# Patient Record
Sex: MALE | Race: WHITE | NOT HISPANIC OR LATINO | URBAN - METROPOLITAN AREA
[De-identification: names, ages, dates, MRNs, and addresses within clinical notes are randomized per-mention and may not be internally consistent; named-entity substitution may affect disease eponyms.]

---

## 2017-01-01 ENCOUNTER — RECORDS - HEALTHEAST (OUTPATIENT)
Dept: LAB | Facility: CLINIC | Age: 82
End: 2017-01-01

## 2017-01-01 LAB
CHOLEST SERPL-MCNC: 120 MG/DL
FASTING STATUS PATIENT QL REPORTED: NORMAL
HDLC SERPL-MCNC: 58 MG/DL
LDLC SERPL CALC-MCNC: 53 MG/DL
TRIGL SERPL-MCNC: 47 MG/DL

## 2017-09-06 ENCOUNTER — HOSPITAL ENCOUNTER (OUTPATIENT)
Dept: PHYSICAL MEDICINE AND REHAB | Facility: CLINIC | Age: 82
Discharge: HOME | End: 2017-09-06
Attending: PHYSICAL MEDICINE & REHABILITATION

## 2017-09-06 DIAGNOSIS — M25.551 HIP PAIN, RIGHT: ICD-10-CM

## 2017-09-06 DIAGNOSIS — M79.18 MYOFASCIAL PAIN: ICD-10-CM

## 2017-09-06 DIAGNOSIS — R53.1 WEAKNESS: ICD-10-CM

## 2017-09-06 RX ORDER — SIMVASTATIN 10 MG
10 TABLET ORAL AT BEDTIME
Status: SHIPPED | COMMUNITY
Start: 2017-09-06

## 2017-09-06 ASSESSMENT — MIFFLIN-ST. JEOR: SCORE: 1420.88

## 2017-09-11 ENCOUNTER — HOSPITAL ENCOUNTER (OUTPATIENT)
Dept: CT IMAGING | Facility: HOSPITAL | Age: 82
Discharge: HOME | End: 2017-09-11
Attending: PHYSICAL MEDICINE & REHABILITATION

## 2017-09-11 DIAGNOSIS — M25.551 HIP PAIN, RIGHT: ICD-10-CM

## 2017-09-12 ENCOUNTER — HOSPITAL ENCOUNTER (OUTPATIENT)
Dept: PHYSICAL MEDICINE AND REHAB | Facility: CLINIC | Age: 82
Discharge: HOME | End: 2017-09-12
Attending: PHYSICAL MEDICINE & REHABILITATION

## 2017-09-12 DIAGNOSIS — M79.18 MYOFASCIAL PAIN: ICD-10-CM

## 2017-09-12 DIAGNOSIS — T84.018A: ICD-10-CM

## 2017-09-12 DIAGNOSIS — M25.551 HIP PAIN, RIGHT: ICD-10-CM

## 2017-09-12 DIAGNOSIS — R53.1 WEAKNESS: ICD-10-CM

## 2017-09-12 DIAGNOSIS — Z96.649: ICD-10-CM

## 2018-01-01 ENCOUNTER — OFFICE VISIT - HEALTHEAST (OUTPATIENT)
Dept: CARDIOLOGY | Facility: CLINIC | Age: 83
End: 2018-01-01

## 2018-01-01 ENCOUNTER — HOSPITAL ENCOUNTER (OUTPATIENT)
Dept: MRI IMAGING | Facility: HOSPITAL | Age: 83
Discharge: HOME | End: 2018-08-31
Attending: INTERNAL MEDICINE

## 2018-01-01 ENCOUNTER — RECORDS - HEALTHEAST (OUTPATIENT)
Dept: LAB | Facility: CLINIC | Age: 83
End: 2018-01-01

## 2018-01-01 ENCOUNTER — AMBULATORY - HEALTHEAST (OUTPATIENT)
Dept: CARDIOLOGY | Facility: CLINIC | Age: 83
End: 2018-01-01

## 2018-01-01 ENCOUNTER — RECORDS - HEALTHEAST (OUTPATIENT)
Dept: ADMINISTRATIVE | Facility: OTHER | Age: 83
End: 2018-01-01

## 2018-01-01 ENCOUNTER — COMMUNICATION - HEALTHEAST (OUTPATIENT)
Dept: CARDIOLOGY | Facility: CLINIC | Age: 83
End: 2018-01-01

## 2018-01-01 ENCOUNTER — OFFICE VISIT - HEALTHEAST (OUTPATIENT)
Dept: GERIATRICS | Facility: CLINIC | Age: 83
End: 2018-01-01

## 2018-01-01 ENCOUNTER — HOSPITAL ENCOUNTER (OUTPATIENT)
Dept: NUCLEAR MEDICINE | Facility: HOSPITAL | Age: 83
Discharge: HOME | End: 2018-03-08
Attending: INTERNAL MEDICINE

## 2018-01-01 ENCOUNTER — AMBULATORY - HEALTHEAST (OUTPATIENT)
Dept: GERIATRICS | Facility: CLINIC | Age: 83
End: 2018-01-01

## 2018-01-01 ENCOUNTER — HOSPITAL ENCOUNTER (OUTPATIENT)
Dept: CARDIOLOGY | Facility: HOSPITAL | Age: 83
Discharge: HOME | End: 2018-03-08
Attending: INTERNAL MEDICINE

## 2018-01-01 ENCOUNTER — HOSPITAL ENCOUNTER (OUTPATIENT)
Dept: CARDIOLOGY | Facility: HOSPITAL | Age: 83
Discharge: HOME | End: 2018-07-06
Attending: INTERNAL MEDICINE

## 2018-01-01 ENCOUNTER — AMBULATORY - HEALTHEAST (OUTPATIENT)
Dept: ADMINISTRATIVE | Facility: CLINIC | Age: 83
End: 2018-01-01

## 2018-01-01 DIAGNOSIS — I25.5 ISCHEMIC CARDIOMYOPATHY: ICD-10-CM

## 2018-01-01 DIAGNOSIS — I10 ESSENTIAL HYPERTENSION: ICD-10-CM

## 2018-01-01 DIAGNOSIS — I50.20 HFREF (HEART FAILURE WITH REDUCED EJECTION FRACTION) (H): ICD-10-CM

## 2018-01-01 DIAGNOSIS — I48.20 CHRONIC ATRIAL FIBRILLATION (H): ICD-10-CM

## 2018-01-01 DIAGNOSIS — I25.10 CORONARY ARTERY DISEASE INVOLVING NATIVE CORONARY ARTERY OF NATIVE HEART WITHOUT ANGINA PECTORIS: ICD-10-CM

## 2018-01-01 DIAGNOSIS — Z96.649 S/P REVISION OF TOTAL HIP: ICD-10-CM

## 2018-01-01 DIAGNOSIS — D64.9 ANEMIA: ICD-10-CM

## 2018-01-01 DIAGNOSIS — I42.0 DILATED CARDIOMYOPATHY (H): ICD-10-CM

## 2018-01-01 DIAGNOSIS — Z79.01 ANTICOAGULATION MANAGEMENT ENCOUNTER: ICD-10-CM

## 2018-01-01 DIAGNOSIS — Z96.641 HISTORY OF TOTAL RIGHT HIP ARTHROPLASTY: ICD-10-CM

## 2018-01-01 DIAGNOSIS — I48.19 PERSISTENT ATRIAL FIBRILLATION (H): ICD-10-CM

## 2018-01-01 DIAGNOSIS — R79.89 ELEVATED BRAIN NATRIURETIC PEPTIDE (BNP) LEVEL: ICD-10-CM

## 2018-01-01 DIAGNOSIS — I50.22 CHRONIC SYSTOLIC CONGESTIVE HEART FAILURE (H): ICD-10-CM

## 2018-01-01 DIAGNOSIS — I25.10 CORONARY ARTERY DISEASE: ICD-10-CM

## 2018-01-01 DIAGNOSIS — I48.91 AF (ATRIAL FIBRILLATION) (H): ICD-10-CM

## 2018-01-01 DIAGNOSIS — I50.20 HEART FAILURE WITH REDUCED EJECTION FRACTION (H): ICD-10-CM

## 2018-01-01 DIAGNOSIS — E78.5 DYSLIPIDEMIA: ICD-10-CM

## 2018-01-01 DIAGNOSIS — R52 PAIN MANAGEMENT: ICD-10-CM

## 2018-01-01 DIAGNOSIS — Z51.81 ANTICOAGULATION MANAGEMENT ENCOUNTER: ICD-10-CM

## 2018-01-01 DIAGNOSIS — M19.90 DEGENERATIVE JOINT DISEASE: ICD-10-CM

## 2018-01-01 LAB
ALBUMIN SERPL-MCNC: 3.9 G/DL (ref 3.5–5)
ALP SERPL-CCNC: 102 U/L (ref 45–120)
ALT SERPL W P-5'-P-CCNC: 23 U/L (ref 0–45)
ANION GAP SERPL CALCULATED.3IONS-SCNC: 10 MMOL/L (ref 5–18)
ANION GAP SERPL CALCULATED.3IONS-SCNC: 10 MMOL/L (ref 5–18)
ANION GAP SERPL CALCULATED.3IONS-SCNC: 11 MMOL/L (ref 5–18)
ANION GAP SERPL CALCULATED.3IONS-SCNC: 12 MMOL/L (ref 5–18)
ANION GAP SERPL CALCULATED.3IONS-SCNC: 9 MMOL/L (ref 5–18)
AORTIC ROOT: 3.5 CM
AORTIC VALVE MEAN VELOCITY: 153 CM/S
AORTIC VALVE MEAN VELOCITY: 165 CM/S
AR DECEL SLOPE: 1780 MM/S2
AR DECEL SLOPE: 2610 MM/S2
AR PEAK VELOCITY: 416 CM/S
AR PEAK VELOCITY: 424 CM/S
AST SERPL W P-5'-P-CCNC: 28 U/L (ref 0–40)
ATRIAL RATE - MUSE: NORMAL BPM
AV AR END DIASTOLIC VELOCITY: 311 CM/S
AV AR END DIASTOLIC VELOCITY: 317 CM/S
AV AR END DIASTOLIC VELOCITY: 322 CM/S
AV CUSP SEPERATION: 1 CM
AV CUSP SEPERATION: 1 CM
AV DIMENSIONLESS INDEX VTI: 0.3
AV DIMENSIONLESS INDEX VTI: 0.3
AV MEAN GRADIENT: 11 MMHG
AV MEAN GRADIENT: 13 MMHG
AV PEAK GRADIENT: 19.2 MMHG
AV PEAK GRADIENT: 20.1 MMHG
AV REGURGITANT PEAK GRADIENT: 69.2 MMHG
AV REGURGITANT PEAK GRADIENT: 71.9 MMHG
AV REGURGITATION PRESSURE HALF TIME: 533 MS
AV REGURGITATION PRESSURE HALF TIME: 686 MS
AV VALVE AREA: 1.1 CM2
AV VALVE AREA: 1.1 CM2
AV VELOCITY RATIO: 0.4
BILIRUB SERPL-MCNC: 1.9 MG/DL (ref 0–1)
BNP SERPL-MCNC: 1619 PG/ML (ref 0–88)
BNP SERPL-MCNC: 2370 PG/ML (ref 0–88)
BNP SERPL-MCNC: 989 PG/ML (ref 0–88)
BSA FOR ECHO PROCEDURE: 1.9 M2
BSA FOR ECHO PROCEDURE: 1.93 M2
BUN SERPL-MCNC: 12 MG/DL (ref 8–28)
BUN SERPL-MCNC: 12 MG/DL (ref 8–28)
BUN SERPL-MCNC: 13 MG/DL (ref 8–28)
BUN SERPL-MCNC: 14 MG/DL (ref 8–28)
BUN SERPL-MCNC: 15 MG/DL (ref 8–28)
BUN SERPL-MCNC: 16 MG/DL (ref 8–28)
BUN SERPL-MCNC: 17 MG/DL (ref 8–28)
BUN SERPL-MCNC: 17 MG/DL (ref 8–28)
BUN SERPL-MCNC: 18 MG/DL (ref 8–28)
CALCIUM SERPL-MCNC: 10 MG/DL (ref 8.5–10.5)
CALCIUM SERPL-MCNC: 10.1 MG/DL (ref 8.5–10.5)
CALCIUM SERPL-MCNC: 10.1 MG/DL (ref 8.5–10.5)
CALCIUM SERPL-MCNC: 10.2 MG/DL (ref 8.5–10.5)
CALCIUM SERPL-MCNC: 10.2 MG/DL (ref 8.5–10.5)
CALCIUM SERPL-MCNC: 10.7 MG/DL (ref 8.5–10.5)
CALCIUM SERPL-MCNC: 9.6 MG/DL (ref 8.5–10.5)
CALCIUM SERPL-MCNC: 9.9 MG/DL (ref 8.5–10.5)
CALCIUM SERPL-MCNC: 9.9 MG/DL (ref 8.5–10.5)
CCTA EJECTION FRACTION: 40 %
CCTA INTERVENTRICULAR SETPUM: 0.8 CM (ref 0.6–1.1)
CCTA LEFT INTERNAL DIMENSION IN SYSTOLE: 4.5 CM (ref 2.1–4)
CCTA LEFT VENTRICULAR INTERNAL DIMENSION IN DIASTOLE: 5.9 CM (ref 3.5–6)
CCTA LEFT VENTRICULAR MASS: 180.72 G
CCTA POSTERIOR WALL: 0.8 CM (ref 0.6–1.1)
CHLORIDE BLD-SCNC: 101 MMOL/L (ref 98–107)
CHLORIDE BLD-SCNC: 84 MMOL/L (ref 98–107)
CHLORIDE BLD-SCNC: 84 MMOL/L (ref 98–107)
CHLORIDE BLD-SCNC: 87 MMOL/L (ref 98–107)
CHLORIDE BLD-SCNC: 88 MMOL/L (ref 98–107)
CHLORIDE BLD-SCNC: 88 MMOL/L (ref 98–107)
CHLORIDE BLD-SCNC: 93 MMOL/L (ref 98–107)
CHLORIDE BLD-SCNC: 93 MMOL/L (ref 98–107)
CHLORIDE BLD-SCNC: 99 MMOL/L (ref 98–107)
CO2 SERPL-SCNC: 25 MMOL/L (ref 22–31)
CO2 SERPL-SCNC: 25 MMOL/L (ref 22–31)
CO2 SERPL-SCNC: 28 MMOL/L (ref 22–31)
CO2 SERPL-SCNC: 29 MMOL/L (ref 22–31)
CO2 SERPL-SCNC: 29 MMOL/L (ref 22–31)
CO2 SERPL-SCNC: 30 MMOL/L (ref 22–31)
CO2 SERPL-SCNC: 30 MMOL/L (ref 22–31)
CREAT SERPL-MCNC: 0.75 MG/DL (ref 0.7–1.3)
CREAT SERPL-MCNC: 0.76 MG/DL (ref 0.7–1.3)
CREAT SERPL-MCNC: 0.76 MG/DL (ref 0.7–1.3)
CREAT SERPL-MCNC: 0.78 MG/DL (ref 0.7–1.3)
CREAT SERPL-MCNC: 0.81 MG/DL (ref 0.7–1.3)
CREAT SERPL-MCNC: 0.82 MG/DL (ref 0.7–1.3)
CREAT SERPL-MCNC: 0.92 MG/DL (ref 0.7–1.3)
CV ECHO HEIGHT: 68.5 IN
CV ECHO HEIGHT: 68.5 IN
CV ECHO WEIGHT: 165 LBS
CV ECHO WEIGHT: 171 LBS
CV STRESS CURRENT BP HE: NORMAL
CV STRESS CURRENT HR HE: 64
CV STRESS CURRENT HR HE: 64
CV STRESS CURRENT HR HE: 65
CV STRESS CURRENT HR HE: 66
CV STRESS CURRENT HR HE: 66
CV STRESS CURRENT HR HE: 67
CV STRESS CURRENT HR HE: 68
CV STRESS CURRENT HR HE: 68
CV STRESS CURRENT HR HE: 69
CV STRESS CURRENT HR HE: 70
CV STRESS CURRENT HR HE: 71
CV STRESS CURRENT HR HE: 72
CV STRESS CURRENT HR HE: 73
CV STRESS CURRENT HR HE: 73
CV STRESS DEVIATION TIME HE: NORMAL
CV STRESS ECHO PERCENT HR HE: NORMAL
CV STRESS EXERCISE STAGE HE: NORMAL
CV STRESS FINAL RESTING BP HE: NORMAL
CV STRESS FINAL RESTING HR HE: 68
CV STRESS MAX HR HE: 84
CV STRESS MAX TREADMILL GRADE HE: 0
CV STRESS MAX TREADMILL SPEED HE: 0
CV STRESS PEAK DIA BP HE: NORMAL
CV STRESS PEAK SYS BP HE: NORMAL
CV STRESS PHASE HE: NORMAL
CV STRESS PROTOCOL HE: NORMAL
CV STRESS RESTING PT POSITION HE: NORMAL
CV STRESS ST DEVIATION AMOUNT HE: NORMAL
CV STRESS ST DEVIATION ELEVATION HE: NORMAL
CV STRESS ST EVELATION AMOUNT HE: NORMAL
CV STRESS TEST TYPE HE: NORMAL
CV STRESS TOTAL STAGE TIME MIN 1 HE: NORMAL
DIASTOLIC BLOOD PRESSURE - MUSE: NORMAL MMHG
DOP CALC AO PEAK VEL: 219 CM/S
DOP CALC AO PEAK VEL: 224 CM/S
DOP CALC AO VTI: 43.8 CM
DOP CALC AO VTI: 48.6 CM
DOP CALC LVOT AREA: 3.14 CM2
DOP CALC LVOT AREA: 3.46 CM2
DOP CALC LVOT DIAMETER: 2 CM
DOP CALC LVOT DIAMETER: 2.1 CM
DOP CALC LVOT PEAK VEL: 93.1 CM/S
DOP CALC LVOT STROKE VOLUME: 46.7 CM3
DOP CALC LVOT STROKE VOLUME: 51.5 CM3
DOP CALC MV VTI: 26 CM
DOP CALCLVOT PEAK VEL VTI: 13.5 CM
DOP CALCLVOT PEAK VEL VTI: 16.4 CM
ECHO EJECTION FRACTION ESTIMATED: 25 %
ECHO EJECTION FRACTION ESTIMATED: 35 %
EJECTION FRACTION: 49 % (ref 55–75)
ERYTHROCYTE [DISTWIDTH] IN BLOOD BY AUTOMATED COUNT: 10.6 % (ref 11–14.5)
ERYTHROCYTE [DISTWIDTH] IN BLOOD BY AUTOMATED COUNT: 12.3 % (ref 11–14.5)
ERYTHROCYTE [DISTWIDTH] IN BLOOD BY AUTOMATED COUNT: 12.8 % (ref 11–14.5)
FRACTIONAL SHORTENING: 16.5 % (ref 28–44)
FRACTIONAL SHORTENING: 19.2 % (ref 28–44)
GFR SERPL CREATININE-BSD FRML MDRD: >60 ML/MIN/1.73M2
GLUCOSE BLD-MCNC: 101 MG/DL (ref 70–125)
GLUCOSE BLD-MCNC: 101 MG/DL (ref 70–125)
GLUCOSE BLD-MCNC: 107 MG/DL (ref 70–125)
GLUCOSE BLD-MCNC: 125 MG/DL (ref 70–125)
GLUCOSE BLD-MCNC: 86 MG/DL (ref 70–125)
GLUCOSE BLD-MCNC: 90 MG/DL (ref 70–125)
GLUCOSE BLD-MCNC: 91 MG/DL (ref 70–125)
GLUCOSE BLD-MCNC: 92 MG/DL (ref 70–125)
GLUCOSE BLD-MCNC: 98 MG/DL (ref 70–125)
HBA1C MFR BLD: 5.8 % (ref 4.2–6.1)
HCT VFR BLD AUTO: 42.1 % (ref 40–54)
HCT VFR BLD AUTO: 43.8 % (ref 40–54)
HCT VFR BLD AUTO: 45.5 % (ref 40–54)
HGB BLD-MCNC: 14.4 G/DL (ref 14–18)
HGB BLD-MCNC: 14.7 G/DL (ref 14–18)
HGB BLD-MCNC: 15 G/DL (ref 14–18)
HGB BLD-MCNC: 9.8 G/DL (ref 14–18)
INTERPRETATION ECG - MUSE: NORMAL
INTERVENTRICULAR SEPTUM IN END DIASTOLE: 0.9 CM (ref 0.6–1)
INTERVENTRICULAR SEPTUM IN END DIASTOLE: 0.99 CM (ref 0.6–1)
IVS/PW RATIO: 0.8
IVS/PW RATIO: 0.9
LA AREA 1: 27 CM2
LA AREA 2: 27 CM2
LEFT ATRIUM LENGTH: 6.35 CM
LEFT ATRIUM SIZE: 5.7 CM
LEFT ATRIUM VOLUME INDEX: 51.4 ML/M2
LEFT ATRIUM VOLUME: 97.6 ML
LEFT VENTRICLE CARDIAC INDEX: 1.9 L/MIN/M2
LEFT VENTRICLE CARDIAC INDEX: 2 L/MIN/M2
LEFT VENTRICLE CARDIAC OUTPUT: 3.7 L/MIN
LEFT VENTRICLE CARDIAC OUTPUT: 3.7 L/MIN
LEFT VENTRICLE DIASTOLIC VOLUME INDEX: 45.8 CM3/M2 (ref 34–74)
LEFT VENTRICLE DIASTOLIC VOLUME: 87 CM3 (ref 62–150)
LEFT VENTRICLE HEART RATE: 71 BPM
LEFT VENTRICLE HEART RATE: 80 BPM
LEFT VENTRICLE MASS INDEX: 102.2 G/M2
LEFT VENTRICLE MASS INDEX: 116.5 G/M2
LEFT VENTRICLE SYSTOLIC VOLUME INDEX: 23.2 CM3/M2 (ref 11–31)
LEFT VENTRICLE SYSTOLIC VOLUME: 44 CM3 (ref 21–61)
LEFT VENTRICULAR INTERNAL DIMENSION IN DIASTOLE: 5.2 CM (ref 4.2–5.8)
LEFT VENTRICULAR INTERNAL DIMENSION IN DIASTOLE: 5.46 CM (ref 4.2–5.8)
LEFT VENTRICULAR INTERNAL DIMENSION IN SYSTOLE: 4.2 CM (ref 2.5–4)
LEFT VENTRICULAR INTERNAL DIMENSION IN SYSTOLE: 4.56 CM (ref 2.5–4)
LEFT VENTRICULAR MASS: 194.2 G
LEFT VENTRICULAR MASS: 224.8 G
LEFT VENTRICULAR OUTFLOW TRACT MEAN GRADIENT: 1 MMHG
LEFT VENTRICULAR OUTFLOW TRACT MEAN GRADIENT: 2 MMHG
LEFT VENTRICULAR OUTFLOW TRACT MEAN VELOCITY: 48.7 CM/S
LEFT VENTRICULAR OUTFLOW TRACT MEAN VELOCITY: 64 CM/S
LEFT VENTRICULAR OUTFLOW TRACT PEAK GRADIENT: 3 MMHG
LEFT VENTRICULAR POSTERIOR WALL IN END DIASTOLE: 1.1 CM (ref 0.6–1)
LEFT VENTRICULAR POSTERIOR WALL IN END DIASTOLE: 1.11 CM (ref 0.6–1)
LV STROKE VOLUME INDEX: 24.6 ML/M2
LV STROKE VOLUME INDEX: 26.7 ML/M2
MCH RBC QN AUTO: 31.7 PG (ref 27–34)
MCH RBC QN AUTO: 32.5 PG (ref 27–34)
MCH RBC QN AUTO: 33.1 PG (ref 27–34)
MCHC RBC AUTO-ENTMCNC: 33 G/DL (ref 32–36)
MCHC RBC AUTO-ENTMCNC: 33.6 G/DL (ref 32–36)
MCHC RBC AUTO-ENTMCNC: 34.2 G/DL (ref 32–36)
MCV RBC AUTO: 100 FL (ref 80–100)
MCV RBC AUTO: 93 FL (ref 80–100)
MCV RBC AUTO: 97 FL (ref 80–100)
MITRAL VALVE DECELERATION SLOPE: 3390 MM/S2
MITRAL VALVE MEAN INFLOW VELOCITY: 55.7 CM/S
MITRAL VALVE PEAK VELOCITY: 102 CM/S
MITRAL VALVE PRESSURE HALF-TIME: 92 MS
MR CARDIAC LEFT VENTRIAL CARDIAC INDEX: 2.2 L/MIN/M2 (ref 1.7–4.2)
MR CARDIAC LEFT VENTRICAL CARDIAC OUTPUT: 4.2 L/MIN (ref 2.8–8.8)
MR CARDIAC LEFT VENTRICULAR DIASTOLIC VOLUME INDEX: 80.9 ML/M2 (ref 47–92)
MR CARDIAC LEFT VENTRICULAR MASS INDEX: 88.95 G/M2 (ref 70–113)
MR CARDIAC LEFT VENTRICULAR MASS: 168 G (ref 118–238)
MR CARDIAC LEFT VENTRICULAR STROKE VOLUME INDEX: 26.79 ML/M2 (ref 32–62)
MR CARDIAC LEFT VENTRICULAR SYSTOLIC VOLUME INDEX: 54.11 ML/M2 (ref 13–30)
MR EJECTION FRACTION: 33.12 %
MR HEIGHT: 1.74 M
MR LEFT VENTRICULAR DYSTOLIC VOLUMEN: 152.8 ML (ref 77–195)
MR LEFT VENTRICULAR STROKE VOLUMEN: 50.6 ML (ref 51–133)
MR LEFT VENTRICULAR SYSTOLIC VOLUME: 102.2 ML (ref 19–72)
MR WEIGHT: 74.4 KG
MV AREA VTI: 1.8 CM2
MV AVERAGE E/E' RATIO: 17.6 CM/S
MV DECELERATION TIME: 127 MS
MV E'TISSUE VEL-LAT: 6.82 CM/S
MV E'TISSUE VEL-MED: 4.29 CM/S
MV LATERAL E/E' RATIO: 14.3
MV MEAN GRADIENT: 2 MMHG
MV MEDIAL E/E' RATIO: 22.8
MV PEAK E VELOCITY: 97.7 CM/S
MV PEAK GRADIENT: 4.2 MMHG
MV VALVE AREA BY CONTINUITY EQUATION: 1.8 CM2
MV VALVE AREA PRESSURE 1/2 METHOD: 2.4 CM2
NUC REST DIASTOLIC VOLUME INDEX: 2640 LBS
NUC REST DIASTOLIC VOLUME INDEX: 2736 LBS
NUC REST SYSTOLIC VOLUME INDEX: 68.5 IN
NUC REST SYSTOLIC VOLUME INDEX: 68.5 IN
NUC STRESS EJECTION FRACTION: 43 %
P AXIS - MUSE: NORMAL DEGREES
PLATELET # BLD AUTO: 153 THOU/UL (ref 140–440)
PLATELET # BLD AUTO: 178 THOU/UL (ref 140–440)
PLATELET # BLD AUTO: 206 THOU/UL (ref 140–440)
PMV BLD AUTO: 6.8 FL (ref 7–10)
PMV BLD AUTO: 8.6 FL (ref 8.5–12.5)
PMV BLD AUTO: 9.2 FL (ref 8.5–12.5)
POTASSIUM BLD-SCNC: 3.6 MMOL/L (ref 3.5–5)
POTASSIUM BLD-SCNC: 3.7 MMOL/L (ref 3.5–5)
POTASSIUM BLD-SCNC: 3.7 MMOL/L (ref 3.5–5)
POTASSIUM BLD-SCNC: 3.8 MMOL/L (ref 3.5–5)
POTASSIUM BLD-SCNC: 3.8 MMOL/L (ref 3.5–5)
POTASSIUM BLD-SCNC: 3.9 MMOL/L (ref 3.5–5)
POTASSIUM BLD-SCNC: 4.2 MMOL/L (ref 3.5–5)
PR INTERVAL - MUSE: NORMAL MS
PR MAX PG: 13 MMHG
PR PEAK VELOCITY: 189 CM/S
PROT SERPL-MCNC: 7 G/DL (ref 6–8)
QRS DURATION - MUSE: 88 MS
QT - MUSE: 414 MS
QTC - MUSE: 462 MS
R AXIS - MUSE: -16 DEGREES
RBC # BLD AUTO: 4.52 MILL/UL (ref 4.4–6.2)
RBC # BLD AUTO: 4.54 MILL/UL (ref 4.4–6.2)
RBC # BLD AUTO: 4.54 MILL/UL (ref 4.4–6.2)
SODIUM SERPL-SCNC: 123 MMOL/L (ref 136–145)
SODIUM SERPL-SCNC: 124 MMOL/L (ref 136–145)
SODIUM SERPL-SCNC: 127 MMOL/L (ref 136–145)
SODIUM SERPL-SCNC: 128 MMOL/L (ref 136–145)
SODIUM SERPL-SCNC: 128 MMOL/L (ref 136–145)
SODIUM SERPL-SCNC: 129 MMOL/L (ref 136–145)
SODIUM SERPL-SCNC: 130 MMOL/L (ref 136–145)
SODIUM SERPL-SCNC: 138 MMOL/L (ref 136–145)
SODIUM SERPL-SCNC: 140 MMOL/L (ref 136–145)
STRESS ECHO BASELINE BP: NORMAL
STRESS ECHO BASELINE HR: 64
STRESS ECHO CALCULATED PERCENT HR: 60 %
STRESS ECHO LAST STRESS BP: NORMAL
STRESS ECHO LAST STRESS HR: 65
SYSTOLIC BLOOD PRESSURE - MUSE: NORMAL MMHG
T AXIS - MUSE: 254 DEGREES
TRICUSPID REGURGITATION PEAK PRESSURE GRADIENT: 40.7 MMHG
TRICUSPID VALVE ANULAR PLANE SYSTOLIC EXCURSION: 1.5 CM
TRICUSPID VALVE PEAK REGURGITANT VELOCITY: 319 CM/S
VENTRICULAR RATE- MUSE: 75 BPM
WBC: 4.6 THOU/UL (ref 4–11)
WBC: 4.8 THOU/UL (ref 4–11)
WBC: 5.1 THOU/UL (ref 4–11)

## 2018-01-01 RX ORDER — LISINOPRIL 10 MG/1
10 TABLET ORAL DAILY
Qty: 90 TABLET | Refills: 3 | Status: SHIPPED | OUTPATIENT
Start: 2018-01-01

## 2018-01-01 RX ORDER — ATENOLOL 50 MG/1
50 TABLET ORAL 2 TIMES DAILY
Refills: 0 | Status: SHIPPED | COMMUNITY
Start: 2018-01-01

## 2018-01-01 RX ORDER — FUROSEMIDE 40 MG
40 TABLET ORAL DAILY
Qty: 30 TABLET | Refills: 11 | Status: SHIPPED | OUTPATIENT
Start: 2018-01-01

## 2018-01-01 RX ORDER — SPIRONOLACTONE 25 MG/1
12.5 TABLET ORAL DAILY
Qty: 15 TABLET | Refills: 2 | Status: SHIPPED | OUTPATIENT
Start: 2018-01-01

## 2018-01-01 RX ORDER — ACETAMINOPHEN 500 MG
1000 TABLET ORAL 3 TIMES DAILY PRN
Status: SHIPPED | COMMUNITY
Start: 2018-01-01

## 2018-01-01 RX ORDER — AMLODIPINE BESYLATE 2.5 MG/1
2.5 TABLET ORAL DAILY
Qty: 30 TABLET | Refills: 5 | Status: SHIPPED | OUTPATIENT
Start: 2018-01-01

## 2018-01-01 RX ORDER — WARFARIN SODIUM 4 MG/1
4 TABLET ORAL SEE ADMIN INSTRUCTIONS
Status: SHIPPED | COMMUNITY
Start: 2018-01-01

## 2018-01-01 ASSESSMENT — MIFFLIN-ST. JEOR
SCORE: 1420.88
SCORE: 1416.34
SCORE: 1448.09
SCORE: 1411.8
SCORE: 1448.09
SCORE: 1420.88

## 2021-05-30 ENCOUNTER — RECORDS - HEALTHEAST (OUTPATIENT)
Dept: ADMINISTRATIVE | Facility: CLINIC | Age: 86
End: 2021-05-30

## 2021-05-31 VITALS — WEIGHT: 165 LBS | BODY MASS INDEX: 24.72 KG/M2

## 2021-05-31 VITALS — BODY MASS INDEX: 24.44 KG/M2 | HEIGHT: 69 IN | WEIGHT: 165 LBS

## 2021-06-01 ENCOUNTER — RECORDS - HEALTHEAST (OUTPATIENT)
Dept: ADMINISTRATIVE | Facility: CLINIC | Age: 86
End: 2021-06-01

## 2021-06-01 VITALS — BODY MASS INDEX: 25.33 KG/M2 | WEIGHT: 171 LBS | HEIGHT: 69 IN

## 2021-06-01 VITALS — BODY MASS INDEX: 24.44 KG/M2 | WEIGHT: 165 LBS | HEIGHT: 69 IN

## 2021-06-01 VITALS — BODY MASS INDEX: 24.29 KG/M2 | WEIGHT: 164 LBS | HEIGHT: 69 IN

## 2021-06-01 VITALS — WEIGHT: 171 LBS | BODY MASS INDEX: 25.33 KG/M2 | HEIGHT: 69 IN

## 2021-06-01 VITALS — WEIGHT: 163 LBS | BODY MASS INDEX: 24.14 KG/M2 | HEIGHT: 69 IN

## 2021-06-01 VITALS — WEIGHT: 165 LBS | BODY MASS INDEX: 24.72 KG/M2

## 2021-06-12 NOTE — PROGRESS NOTES
Assessment/Plan:      Diagnoses and all orders for this visit:    Hip pain, right  -     CT Hip Without Contrast Right; Future; Expected date: 9/6/17    Weakness  -     EMG - Clinic; Future; Expected date: 9/6/17    Myofascial pain        Assessment: Pleasant 81-year-old gentleman with a history of hypertension, coronary artery disease, prostate cancer with:    1.  Severe right hip pain intermittently.  Most significant over the past 3-4 months.  Status post right total hip arthroplasty.  Multiple issues and previous MRI including potential trochanteric fracture, edema surrounding the arthroplasty and metallosis.    2.  Weakness of the right hip flexors    3.  myofascial pain in the right lumbar spine gluteal region and hip.      Discussion:    1.  I discussed the diagnosis and treatment optio we discussed the need for further imaging.  We also discussed what is been done thus far.  He reports being seen at [facility] and [facility] over a year ago and did not feel there anything wrong with his hip replacement per his reports.    2.  CT scan right hip as there is several abnormalities found on MRI from 1 year ago.  3.  EMG right lower extremity to evaluate for lumbar radiculopathy given right anterior thigh weakness in the hip flexors and potentially quadriceps.  4.  We will reevaluate after EMG and discuss further treatment options.      It was our pleasure caring for your patient today, if there any questions or concerns please do not hesitate to contact us.      Subjective:   Patient ID: Sage Ladd is a 81 y.o. male.    History of Present Illness: Patient presents for evaluation of right iliac crest gluteal pain anterior thigh pain and groin pain.  Reports having a right total hip arthroplasty performed I believe in the 1990s.  Has had multiple falls in the past telling me he may have fractured the hip in the past but really has done well until the past couple of years.  Has been seeing  [facility] in [facility] last year.  Reports numerous imaging studies.  I have is an MRI.  He reports they did not feel there is anything wrong with his arthroplasty.  With persistent pain he is here for an opinion.  His pain significantly flared 3-4 months ago.  No trauma.  Pain starts in the right greater trochanter along the anterior thigh into the groin custodial down the thigh to the knee.  Difficult to bear any weight on the right lower extremity.  Standing is quite difficult.  After he takes a few steps he can walk better but still uses a cane.  Uses Biofreeze and ice which is quite helpful for him.  He also gets pain into the right iliac crest and some shooting pain up his back.  He has weakness in the right leg but nothing past the knee.    Has seen a chiropractor.  Also again reports being seen by orthopedics over a year ago with no plans for surgical intervention.    Imaging: MRI report and images were personally reviewed and discussed with the patient.  A plastic model was utilized during the discussion.  MRI of the pelvis and right hip from March 2016 reveals postoperative right hip arthroplasty.  Well-corticated bone fragment lateral to the right hip may represent greater trochanter fragment or fracture.  Fluid collection suggestive of pseudotumor versus metallosis.  Degenerative changes of the left hip.  Degenerative changes of lower lumbar spine.    Review of systems: Complains of some constipation, easy bruising, back pain joint pain and leg pain.  No bowel or bladder incontinence.  Sleeping well.  Remainder of 12 point review systems negative unless listed above.    Past Medical History:   Diagnosis Date     Hyperlipidemia      Hypertension      Prostate CA        The following portions of the patient's history were reviewed and updated as appropriate: allergies, current medications, past family history, past medical history, past social history, past surgical history and problem  list.      WHO 5: 16    BRYNN Score: 50      Objective:   Physical Exam:    Vitals:    09/06/17 1251   BP: 120/76   Pulse: 84       General:  Well-appearing male in no acute distress.  Pleasant, cooperative, and interactive throughout the examination and interview.  CV: No lower extremity edema on inspection or paltation.  Lymphatics: No cervical lymphadenopathy palpated. Eyes: sclera clear. Skin: No rashes or lesions seen over the head/neck, hairline, arms, legs,    Respirations unlabored.  MSK: Gait is antalgic with limited weightbearing in the right lower extremity.  Uses single-point cane.  Able to heel-toe stand with assistance from single-point cane .  Negative Romberg.  Spine: normal AP curves of the C, T, and L spine.  Full range of motion in the Lumbar spine in all planes.  Palpation: No tenderness lumbar spinous process or paraspinal tissues.  Tenderness over the right SI joint gluteal tissues.  Extremities: Full range of motion of the elbows, and wrists with no effusions or tenderness to palpation.  Decreased range of motion right hip and internal/external rotation from seated with significant reproduction of symptoms on range of motion testing.  Full range of motion of the  knees, and ankles with no effusions or tenderness to palpation.   No hypermobility of the upper or lower extremities.  Neurologic exam: Mental status: Patient is alert and oriented with normal affect.  Attention, knowledge, memory, and language are intact.  Normal coordination throughout the examination.  Reflexes are 2+ and symmetric biceps, triceps, brachioradialis, patellar, and 1+Achilles with equivocal toes and Negative Reyna's.  Sensation is intact to light touch throughout the upper and lower extremities bilaterally.  Manual muscle testing reveals 4/5 right And 5/5 left hip flexors and knee extensors, 5 out of 5 in the  knee flexors, ankle plantar flexors, ankle  dorsiflexors, and EHL.  Upper extremities: Grossly normal  strength . Normal muscle bulk and tone in the arms and legs.    Negative seated   straight leg raise bilaterally.

## 2021-06-12 NOTE — PROGRESS NOTES
Assessment/Plan:      Diagnoses and all orders for this visit:    Hip pain, right  -     Ambulatory referral to Orthopedic Surgery  -     gabapentin (NEURONTIN) 100 MG capsule; 1 cap at bedtime x 3 days, then may take 1 cap twice daily x 3 days, then may take 1 cap three times daily  Dispense: 90 capsule; Refill: 1    Prosthetic hip implant failure  -     Ambulatory referral to Orthopedic Surgery    Weakness  -     EMG - Clinic; Standing  -     EMG - Clinic        Assessment:Pleasant 81-year-old gentleman with a history of hypertension, coronary artery disease, prostate cancer with:     1.  Severe right hip pain intermittently.  Most significant over the past 3-4 months.  Status post right total hip arthroplasty.  Multiple issues on previous MRI including potential trochanteric fracture, edema surrounding the arthroplasty and metallosis.  New CT scan shows increased lytic lesion size of the right acetabulum and right proximal femur likely related to foreign body ostial lysis.  Potential loosening of the prosthesis.     2.  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  myofascial pain in the right lumbar spine gluteal region and hip.    Discussion:    1.  Patient had presented initially for an EMG of the right lower extremity.  Given the CT findings will cancel the EMG today and proceed with treatment of the hip issue.  Patient agrees with this.  2.  We discussed that he does have failure of the prosthesis and should be seen by orthopedic surgery.  He has been seen in the past by [facility].  He is not interested in being seen at [facility].  Would like to see a different provider at [facility] and I will get him to see Dr. comfort.  3.  He is having significant amount of pain about the right hip region and gluteal region.  We discussed medication options.  Not interested in opioids.  4.  I will prescribe a trial of gabapentin 100 mg 3 times  daily for pain.  5.  He has stopped taking meloxicam as he was taking this with naproxen prior.  6.  Can follow-up with me as needed after seeing orthopedics.      It was our pleasure caring for your patient today, if there any questions or concerns please do not hesitate to contact us.      Subjective:   Patient ID: Sage Ladd is a 81 y.o. male.    History of Present Illness: Patient presents for ongoing evaluation of right hip and gluteal pain with leg pain and weakness.  Symptoms have not changed since last visit.  6/10 today.  Severe pain over the right anterior thigh to the knee and right lateral thigh prison to the knee.  Pain with any standing.  Severe pain with standing initially takes him a few minutes and then he can walk but it is severe.  He is in a wheelchair today and uses a cane in general.  This pain is been really bad for the past few months.  Previously seen by orthopedics.  Is here today for an EMG of the right leg given his severe weakness but recently had a CT of his pelvis done as well and that was reviewed.  Given the abnormalities on CT scan we will hold off on EMG and patient agrees.  Does ask if there is anything else for the pain as this is becoming quite severe.    Was seen at [facility] orth[facility] in the past.  Tells me they did not have a plan for him.  He has not been there for quite some time.  Does not want to see someone orthopedics as that is where his initial surgery was done over 20 years ago.  I also gather that he also wants to be close to home.    Imaging: *CT scan of the pelvis and right hip personally reviewed.  Report and images personally reviewed and discussed with the patient.  There is an increase in size of the lytic lesion involving the right acetabulum, ischium, proximal femur and the femoral component of the arthroplasty likely representing foreign body ostial lysis.  There is also abnormality of the acetabular cup which could indicate loosening.  Some  moderate osteoarthritis of the left hip.    Review of Systems: No numbness or tingling .  No bowel or bladder incontinence.  No headaches, dizziness, nausea, vomiting, blurred vision or balance deficits.    Past Medical History:   Diagnosis Date     Hyperlipidemia      Hypertension      Prostate CA        The following portions of the patient's history were reviewed and updated as appropriate: allergies, current medications, past family history, past medical history, past social history, past surgical history and problem list.      Objective:   Physical Exam:    Vitals:    09/12/17 0936   BP: (!) 184/82   Pulse: 76       General: Alert and oriented with normal affect. Attention, knowledge, memory, and language are intact. No acute distress.   Eyes: Sclerae are clear.  Respirations: Unlabored. CV: No lower extremity edema.   Gait: Not examined today as it severe pain with him standing.  Is in a wheelchair today for examination.    Sensation is intact to light touch throughout the   lower extremities.  Reflexes are 2+ and symmetric in the biceps triceps and brachioradialis with negative Hoffmans. 1+  patellar and trace Achilles .    Manual muscle testing reveals:  Right /Left out of 5     3+/5 hip flexors -potential give way to pain  5/5 knee flexors  5/5 knee extensors  5/5 ankle plantar flexors  5/5 ankle dorsiflexors  5/5   ankle evertors

## 2021-06-16 PROBLEM — I48.91 ATRIAL FIBRILLATION (H): Status: ACTIVE | Noted: 2018-01-01

## 2021-06-16 PROBLEM — E78.5 DYSLIPIDEMIA: Status: ACTIVE | Noted: 2018-01-01

## 2021-06-16 PROBLEM — I25.10 CORONARY ARTERY DISEASE: Status: ACTIVE | Noted: 2018-01-01

## 2021-06-16 PROBLEM — I50.20 HEART FAILURE WITH REDUCED EJECTION FRACTION (H): Status: ACTIVE | Noted: 2018-01-01

## 2021-06-16 PROBLEM — I10 ESSENTIAL HYPERTENSION: Status: ACTIVE | Noted: 2018-01-01

## 2021-06-16 NOTE — PROGRESS NOTES
Patient and his daughter in law seen in clinic for HF education s/p recent clinic visit with Dr. Teague 2/22/18.  Reviewed HF Binder that includes the  HF Sx Awareness & Action plan  handout and  A Stronger Pump  booklet and Weight log booklet highlighting :  __X_patient s type of heart failure _X__Na management in diet  __X_importance of daily wts  _X__Fluid Guidelines, if applicable  __X_medication review and importance of compliance     Instructed patient in signs and sx of heart failure, reiterated when to call clinic - reviewed HF hotline # [phone] and after hours call # [phone].  Majority of time was spent reviewing: Obtaining and maintaining a low sodium diet. Patient has not been reading food labels nor following a low sodium diet. Stressed the importance and reasons for a 2,000 mg sodium diet.   Patient verbalized understanding of HF discussion.  Plan for f/u with continued HF education reviewed.

## 2021-06-17 NOTE — PROGRESS NOTES
[facility]    Facility:   [facility] [[id number]]   Code Status: FULL CODE  PCP: Yaya Peñaloza MD   Phone: [phone]   Fax: [phone]      CHIEF COMPLAINT/REASON FOR VISIT:  Chief Complaint   Patient presents with     Discharge Summary       HISTORY COURSE:  Sage is a 82 y.o. male who was recently admitted to the hospital on 4/18/2018.  Was admitted secondary degenerative joint disease which is refractory to conservative management and elective right total hip arthroplasty.  He was admitted to the hospital and there were no perioperative complications and it was reported in the discharge summary that this postoperative course was unremarkable.  He did have blood loss anemia but is unsure whether or not he had blood transfusion or not and his pain was well-controlled with oxycodone and acetaminophen.  Patient was treated appropriately and transferred here to the TCU in stable condition.     Patient also has persistent atrial fibrillation is currently on Coumadin     He has been able to participate with the rehabilitation services and actually he does have his own cane.  His next follow-up with orthopedics is May 3.  His pain is also well managed does have Tylenol but also does have oxycodone usually takes about 1 dose per day.  He also is on warfarin currently at 7 mg rechecking a pro time on April 30.  His appetite is good.  He does have a good sense of humor.  He otherwise is pretty active and does feel like he is able to be discharged to home with various services.  Review of Systems  Currently denies chills fever coughing wheezing chest pain dizziness vertigo nausea vomiting diarrhea dysuria rashes sore stiff neck or swollen glands.  History of hypertension prostate cancer CAD hyperlipidemia and recent right total hip revision  Vitals:    04/26/18 0852   BP: 134/72   Pulse: 80   Resp: 16   Temp: 98  F (36.7  C)   SpO2: 98%       Physical Exam   Constitutional:  He is oriented to person, place, and time. No distress.   HENT:   Head: Normocephalic.   Eyes: Pupils are equal, round, and reactive to light.   Neck: Neck supple. No thyromegaly present.   Cardiovascular:   History of CAD. Irregularity.  2/6 ZONIA.   Pulmonary/Chest: Breath sounds normal.   Abdominal: Bowel sounds are normal. There is no tenderness. There is no guarding.   Genitourinary:   Genitourinary Comments: History of prostate cancer   Musculoskeletal:   Hydrocolloid dressing right hip.  No numbness or tingling to right lower extremity.  Able to use a cane.   Lymphadenopathy:     He has no cervical adenopathy.   Neurological: He is oriented to person, place, and time.   Skin: Skin is warm and dry. No rash noted.     Lab Results   Component Value Date    WBC 4.6 03/29/2018    HGB 9.8 (L) 04/25/2018    HCT 42.1 03/29/2018    MCV 93 03/29/2018     03/29/2018     Results for orders placed or performed in visit on 04/10/18   Basic Metabolic Panel   Result Value Ref Range    Sodium 128 (L) 136 - 145 mmol/L    Potassium 3.8 3.5 - 5.0 mmol/L    Chloride 88 (L) 98 - 107 mmol/L    CO2 30 22 - 31 mmol/L    Anion Gap, Calculation 10 5 - 18 mmol/L    Glucose 125 70 - 125 mg/dL    Calcium 10.1 8.5 - 10.5 mg/dL    BUN 12 8 - 28 mg/dL    Creatinine 0.82 0.70 - 1.30 mg/dL    GFR MDRD Af Amer >60 >60 mL/min/1.73m2    GFR MDRD Non Af Amer >60 >60 mL/min/1.73m2            MEDICATION LIST:  Current Outpatient Prescriptions   Medication Sig     acetaminophen (TYLENOL) 500 MG tablet Take 1,000 mg by mouth 3 (three) times a day as needed for pain.     aspirin 81 MG EC tablet Take 162 mg by mouth daily. For 10 days     atenolol (TENORMIN) 50 MG tablet 50 mg 2 (two) times a day.      furosemide (LASIX) 40 MG tablet Take 1 tablet (40 mg total) by mouth daily.     lisinopril-hydrochlorothiazide (PRINZIDE,ZESTORETIC) 20-25 mg per tablet Take 1 tablet by mouth daily.     oxyCODONE (ROXICODONE) 5 MG immediate release tablet Take 2.5-5  mg by mouth every 4 (four) hours as needed for pain.     simvastatin (ZOCOR) 10 MG tablet Take 10 mg by mouth at bedtime.     spironolactone (ALDACTONE) 25 MG tablet Take 0.5 tablets (12.5 mg total) by mouth daily.       DISCHARGE DIAGNOSIS:    ICD-10-CM    1. S/P revision of total hip Z96.649    2. Essential hypertension I10    3. Coronary artery disease involving native coronary artery of native heart without angina pectoris I25.10    4. Anemia D64.9        MEDICAL EQUIPMENT NEEDS:  Cane    DISCHARGE PLAN/FACE TO FACE:  I certify that services are/were furnished while this patient was under the care of a physician and that a physician or an allowed non-physician practitioner (NPP), had a face-to-face encounter that meets the physician face-to-face encounter requirements. The encounter was in whole, or in part, related to the primary reason for home health. The patient is confined to his/her home and needs intermittent skilled nursing, physical therapy, speech-language pathology, or the continued need for occupational therapy. A plan of care has been established by a physician and is periodically reviewed by a physician.  Date of Face-to-Face Encounter: April 26, 2018    I certify that, based on my findings, the following services are medically necessary home health services: He will be discharging to home with current medications and narcotics he will also will be discharging on Saturday, April 28 for physical and occupational therapy home health aide and nursing in the home care agency has not yet been identified.    My clinical findings support the need for the above skilled services because: (Please write a brief narrative summary that describes what the RN, PT, SLP, or other services will be doing in the home. A list of diagnoses in this section does not meet the CMS requirements.)  He will be discharging with the physical and occupational therapy home health aide and nursing secondary to his most recent  hospitalization April 18 secondary to a right total hip revision along with postoperative anemia hypertension Coumadin and pro time evaluation and management along with medication management.    This patient is homebound because: (Please write a brief narrative summary describing the functional limitations as to why this patient is homebound and specifically what makes this patient homebound.)  Secondary to multiple and current medical conditions including his recent right total hip revision and self-care deficits.    The patient is, or has been, under my care and I have initiated the establishment of the plan of care. This patient will be followed by a physician who will periodically review the plan of care.    Schedule follow up visit with primary care provider within 7 days to reestablish care.  He does have a follow-up with orthopedics on May 3.  He also does have a follow-up in May to cardiology.  His hemoglobin did come back yesterday at 9.8.  He also is on warfarin 7 mg rechecking a pro time on April 30, 2018.  He does feel comfortable upon going home and also does feel as if he will be able to take care of himself with the assistance of various services.    Discharge coordination of care greater than 30 minutes.  Electronically signed by: Michael Duane Johnson, CNP

## 2021-06-17 NOTE — PROGRESS NOTES
[facility]      Facility:    [facility] [[id number]]  Code Status: UNKNOWN      Chief Complaint/Reason for Visit:  Chief Complaint   Patient presents with     H & P     Status post right total hip arthroplasty, pain management, history of chronic systolic congestive heart failure, coronary disease, hypertension, persistent atrial fibrillation, postoperative anemia due to acute blood loss.  Persistent atrial fibrillation.       HPI:   Sage is a 82 y.o. male who was recently admitted to the hospital on 4/18/2018.  Was admitted secondary degenerative joint disease which is refractory to conservative management and elective right total hip arthroplasty.  He was admitted to the hospital and there were no perioperative complications and it was reported in the discharge summary that this postoperative course was unremarkable.  He did have blood loss anemia but is unsure whether or not he had blood transfusion or not and his pain was well-controlled with oxycodone and acetaminophen.  Patient was treated appropriately and transferred here to the TCU in stable condition.    Patient also has persistent atrial fibrillation is currently on Coumadin at this time and INR is pending for today.    Patient claims his pain is adequately controlled irritable bowel movement the day before yesterday.  He will get a Dulcolax suppository tonight if he does not have a bowel movement.  He is urinating a lot but he is on the furosemide at this time and is no burning urgency or  signs of dysuria.  He is sleeping okay and appetite is good.  And this is his second surgery revision on this secondary to a did wear out.    Past Medical History:  Past Medical History:   Diagnosis Date     Atrial fibrillation      Chronic systolic congestive heart failure      Coronary artery disease     involving native coronary artery of native heart with angina pectoris     History of total right hip replacement       Hyperlipidemia      Hypertension      Postoperative anemia due to acute blood loss      Prostate CA            Surgical History:  Past Surgical History:   Procedure Laterality Date     CORONARY ARTERY BYPASS GRAFT  1998     JOINT REPLACEMENT       TOTAL HIP ARTHROPLASTY Right        Family History:   Family History   Problem Relation Age of Onset     Heart disease Father        Social History:    Social History     Social History     Marital status:      Spouse name: N/A     Number of children: N/A     Years of education: N/A     Social History Main Topics     Smoking status: Former Smoker     Types: Cigarettes     Quit date: 1986     Smokeless tobacco: Never Used     Alcohol use Yes     Drug use: No     Sexual activity: Not Asked     Other Topics Concern     None     Social History Narrative          Review of Systems   Constitutional:        Patient claims his pain is well-managed he denies any fevers chills nausea vomiting diarrhea change in vision hearing taste or smell weakness one side the other chest pain shortness of breath.  His last bowel movements with a day beat was the day before yesterday said 2 days without a bowel movement.  The remainder the review of systems is negative.       Vitals:    04/24/18 1058   BP: 155/54   Pulse: 83   Resp: 18   Temp: (!) 96.3  F (35.7  C)   SpO2: 95%       Physical Exam   Constitutional: No distress.   HENT:   Head: Normocephalic and atraumatic.   Nose: Nose normal.   Mouth/Throat: No oropharyngeal exudate.   Eyes: Conjunctivae are normal. Right eye exhibits no discharge. Left eye exhibits no discharge.   Neck: Neck supple. No thyromegaly present.   Cardiovascular:   Murmur heard.  Irregularly irregular with adequate rate control.   Pulmonary/Chest: Effort normal and breath sounds normal. No respiratory distress. He has no wheezes. He has no rales.   Abdominal: Soft. Bowel sounds are normal. He exhibits no distension.   Musculoskeletal:   Right leg edema is noted.   And some swelling.  His left leg is normal at this time.   Neurological: He is alert. No cranial nerve deficit. He exhibits normal muscle tone.   Skin: Skin is warm and dry. He is not diaphoretic.   Psychiatric: He has a normal mood and affect. His behavior is normal.       Medication List:  Current Outpatient Prescriptions   Medication Sig     acetaminophen (TYLENOL) 500 MG tablet Take 1,000 mg by mouth 3 (three) times a day as needed for pain.     aspirin 81 MG EC tablet Take 162 mg by mouth daily. For 10 days     atenolol (TENORMIN) 50 MG tablet 100 mg 2 (two) times a day.      furosemide (LASIX) 40 MG tablet Take 1 tablet (40 mg total) by mouth daily.     lisinopril-hydrochlorothiazide (PRINZIDE,ZESTORETIC) 20-25 mg per tablet Take 1 tablet by mouth daily.     oxyCODONE (ROXICODONE) 5 MG immediate release tablet Take 2.5-5 mg by mouth every 4 (four) hours as needed for pain.     simvastatin (ZOCOR) 10 MG tablet Take 10 mg by mouth at bedtime.     spironolactone (ALDACTONE) 25 MG tablet Take 0.5 tablets (12.5 mg total) by mouth daily.       Labs:      Assessment:    ICD-10-CM    1. History of total right hip arthroplasty Z96.641    2. Degenerative joint disease M19.90    3. Persistent atrial fibrillation I48.1    4. Anticoagulation management encounter Z51.81     Z79.01    5. Pain management R52        Plan: Pain is in good control so no new changes.  We will monitor his bowel movements today and Dulcolax suppository be given if no bowel movement today.  I will give him 5 mg of Coumadin today and we will check a venous Doppler ultrasounds right lower extremity secondary to his been subtherapeutic.  He has persistent atrial fibrillation at this time so no signs of any stroke and continue to monitor above medical problems.        Electronically signed by: Seven Cisneros DO

## 2021-06-26 NOTE — PROGRESS NOTES
Progress Notes by Jenny Teague MD at 8/29/2018  1:10 PM     Author: Jenny Teague MD Service: -- Author Type: Physician    Filed: 8/29/2018  2:03 PM Encounter Date: 8/29/2018 Status: Signed    : Jenny Teague MD (Physician)           Click to link to [facility] HEART CARE NOTE    Thank you, Dr. Peñaloza, for asking us to see Sage Ladd at the [facility].      Assessment/Recommendations   Assessment:    1.  Coronary artery disease status post coronary artery bypass surgery in 1998: Pharmacological stress test shows only a small to medium size area of infarction and no evidence for significant ischemia.  Continue on daily aspirin.  No anginal complaints.  2.    Ischemic cardiomyopathy: Left ventricular ejection fraction 35% on echocardiogram in 43% on stress testing.  Add lisinopril 10 mg a day.  Continue spironolactone.  He does not want to take atenolol to metoprolol or carvedilol.  Will proceed with a cardiac MRI to better assess his left ventricular ejection fraction and consider referral for prophylactic ICD placement.    3.  Chronic systolic congestive heart failure:  He is doing well on Lasix 40 mg twice a day with spironolactone.  4.    Hypertension: Mildly elevated.  Add lisinopril 10 mg a day.  5.  Atrial fibrillation: Chronic and well rate controlled.  On Coumadin for anticoagulation  6.  Aortic stenosis: Will need repeat echocardiogram in a year.  Follow-up in 3 months.       History of Present Illness    Mr. Sage Ladd is a 82 y.o. male with history of coronary artery disease status post coronary artery bypass surgery 1998, severe ischemic cardiomyopathy with left ventricular ejection fraction of 35% on echocardiogram of 43% on stress testing, chronic systolic congestive heart failure, chronic atrial fibrillation hypertension, dyslipidemia who I am seeing in follow-up.  On preop physical he was found to have new  onset atrial fibrillation and an ischemic cardiomyopathy.  He has been doing well on his current medical therapy.  He reports his weights have remained stable and has not noted any problems worsening shortness of breath.  He has mild lower extremity edema that has remained well controlled as well.  Denies any chest pain.    Echocardiogram 7/6/2018      When compared to the previous study dated 3/8/2018, no significant change.    Normal left ventricular size and wall thickness. Left ventricle ejection fraction is moderately decreased. The estimated left ventricular ejection fraction is 35%.    Normal right ventricular size and systolic function.    Calcified and thickened aortic valve with reduced excursion. Mild aortic valve stenosis and mild aortic valve regurgitation.    Mild mitral valve regurgitation.    Lexiscan nuclear stress test 318    1.The pharmacologic nuclear stress test is abnormal.    2.There is a small to medium sized area of scar in the mid to basilar septal segment(s) of the left ventricle. No ischemia suggested.    3.The left ventricular ejection fraction is 43%.    4.When compared to report of the images of 9/19/2007, the septal scar appears new, and ejection fraction was reported 51% is now 43%. Images unavailable.      HOLTER MONITOR     IMPRESSION:  1.  A 24-hour Holter monitor was applied 03/08/2018 with date of interpretation  03/13/2018.  2.  Atrial fibrillation is seen throughout the recording.  3.  Average ventricular rate is 71 beats per minute with heart rate ranging between  37 and 103 beats per minute.  4.  Some nocturnal bradycardia is seen with pauses up to 2 seconds.  During waking  hours, no bradycardia or pauses noted.  5.  Moderately frequent ventricular ectopy consisting of 3516 PVCs.  A few couplets  are seen.  There is a 4-beat cynthia but more of an idioventricular pattern - 82 beats  per minute.  6.  Patient activity diary was not returned.     DISCUSSION:  1.  Persistent  atrial fibrillation with a controlled ventricular response.  1.  Moderately frequent low complexity ventricular ectopy.        CATHLEEN CHEN MD  foley  D 03/13/2018 14:46:04  T 03/13/2018 15:47:37  R 03/13/2018 15:47:37  [id number]     Physical Examination Review of Systems   Vitals:    08/29/18 1317   BP: 140/72   Pulse: 86   Resp: 20     Body mass index is 24.57 kg/(m^2).  Wt Readings from Last 3 Encounters:   08/29/18 164 lb (74.4 kg)   07/06/18 171 lb (77.6 kg)   06/20/18 171 lb (77.6 kg)       General Appearance:   alert, no apparent distress   HEENT:  no scleral icterus; the mucous membranes are pink and moist                                  Neck: jugular venous pressure normal   Chest: the spine is straight and the chest is symmetric   Lungs:   respirations unlabored; the lungs are clear to auscultation   Cardiovascular:   regular rhythm with normal first and second heart sounds and 2/6 systolic murmur   Abdomen:  no organomegaly, masses, bruits, or tenderness; bowel sounds are present   Extremities:  Mild bilateral lower extremity edema   Skin: no xanthelasma    General: WNL  Eyes: WNL  Ears/Nose/Throat: WNL  Lungs: WNL  Heart: WNL  Stomach: WNL  Bladder: WNL  Muscle/Joints: WNL  Skin: WNL  Nervous System: WNL  Mental Health: WNL     Blood: WNL     Medical History  Surgical History Family History Social History   Past Medical History:   Diagnosis Date   ? Atrial fibrillation (H)    ? Chronic systolic congestive heart failure (H)    ? Coronary artery disease     involving native coronary artery of native heart with angina pectoris   ? History of total right hip replacement    ? Hyperlipidemia    ? Hypertension    ? Postoperative anemia due to acute blood loss    ? Prostate CA (H)     Past Surgical History:   Procedure Laterality Date   ? CORONARY ARTERY BYPASS GRAFT  1998   ? JOINT REPLACEMENT     ? TOTAL HIP ARTHROPLASTY Right     Family History   Problem Relation Age of Onset   ? Heart disease Father      Social History     Social History   ? Marital status:      Spouse name: N/A   ? Number of children: N/A   ? Years of education: N/A     Occupational History   ? Not on file.     Social History Main Topics   ? Smoking status: Former Smoker     Types: Cigarettes     Quit date: 1986   ? Smokeless tobacco: Never Used   ? Alcohol use Yes   ? Drug use: No   ? Sexual activity: Not on file     Other Topics Concern   ? Not on file     Social History Narrative          Medications  Allergies      No Known Allergies      Lab Results    Chemistry/lipid CBC Cardiac Enzymes/BNP/TSH/INR   Lab Results   Component Value Date    CHOL 120 09/22/2017    HDL 58 09/22/2017    LDLCALC 53 09/22/2017    TRIG 47 09/22/2017    CREATININE 0.92 06/20/2018    BUN 18 06/20/2018    K 3.7 06/20/2018     06/20/2018     06/20/2018    CO2 28 06/20/2018    Lab Results   Component Value Date    WBC 4.6 03/29/2018    HGB 9.8 (L) 04/25/2018    HCT 42.1 03/29/2018    MCV 93 03/29/2018     03/29/2018    Lab Results   Component Value Date    BNP 1619 (H) 06/20/2018

## 2021-06-26 NOTE — PROGRESS NOTES
Progress Notes by Jenny Teague MD at 2/22/2018  2:00 PM     Author: Jenny Teague MD Service: -- Author Type: Physician    Filed: 2/22/2018  2:54 PM Encounter Date: 2/22/2018 Status: Addendum    : Jenny Teague MD (Physician)    Related Notes: Original Note by Jenny Teague MD (Physician) filed at 2/22/2018  2:52 PM           Click to link to [facility] HEART CARE NOTE    Thank you, Dr. Peñaloza, for asking us to see Sage Ladd at the [facility].      Assessment/Recommendations   Assessment:    1.  Preoperative evaluation: Preoperative evaluation prior to right hip replacement surgery.  Poor functional capacity and noting worsening shortness of breath with exertion recently.  Will proceed with a Lexiscan nuclear stress test for further risk assessment prior to surgery.  Due to dyspnea will proceed with checking BNP, BMP and hemoglobin today.  2.  Murmur: Heart murmur on exam.  Echocardiogram to evaluate for structural heart disease.  3.  Coronary artery disease: Coronary artery disease status post coronary bypass grafting in 1998.  Continue on aspirin.  4.  Atrial fibrillation: Likely persistent asymptomatic atrial fibrillation.  Holter monitor to ensure good rate control.  5.  Anticoagulation: [id number] = 4.  We will start Eliquis 5 mg twice daily.  This may be held for surgery.  6.  Hypertension: Increase lisinopril to 40 mg daily.  Continue atenolol and hydrochlorothiazide.  Follow-up with me after hip replacement surgery or depending on results of above tests.       History of Present Illness    Mr. Sage Ladd is a 82 y.o. male with history of coronary artery disease status post coronary artery bypass surgery 1998, hypertension, dyslipidemia who I am seeing today for an initial consultation per for preoperative evaluation prior to right hip replacement surgery that is scheduled for 3/2/18.  He denies any  chest pain.  He has noticed over the past 6 months that he has had problems with worsening shortness of breath with exertion and occasionally he wakes up at night with shortness of breath.  Over the past 2-3 not months he has noticed increased mild lower extremity edema.  No orthopnea or palpitations.  He did notice that on his heart monitor that he sometimes uses to check his blood pressure is well and has been noting an irregular heart rhythm for months now.  He was seen for preoperative evaluation by his primary and found to be in atrial fibrillation.  EKG today shows atrial fibrillation at 75 bpm.       Physical Examination Review of Systems   Vitals:    02/22/18 1404   BP: 162/90   Pulse: 76   Resp: 16     Body mass index is 24.72 kg/(m^2).  Wt Readings from Last 3 Encounters:   02/22/18 165 lb (74.8 kg)   09/12/17 165 lb (74.8 kg)   09/06/17 165 lb (74.8 kg)       General Appearance:   alert, no apparent distress   HEENT:  no scleral icterus; the mucous membranes are pink and moist                                  Neck: jugular venous pressure normal   Chest: the spine is straight and the chest is symmetric   Lungs:   respirations unlabored; the lungs are clear to auscultation   Cardiovascular:   regular rhythm with normal first and second heart sounds and 2/6 systolic murmur   Abdomen:  no organomegaly, masses, bruits, or tenderness; bowel sounds are present   Extremities:  Mild bilateral lower extremity pitting edema   Skin: no xanthelasma    General: Night Sweats  Eyes: WNL  Ears/Nose/Throat: Nosebleeds  Lungs: Shortness of Breath  Heart: Arm Pain, Shortness of Breath with activity, Irregular Heartbeat, Leg Swelling  Stomach: WNL  Bladder: WNL  Muscle/Joints: Joint Pain  Skin: WNL  Nervous System: Loss of Balance  Mental Health: Depression     Blood: WNL     Medical History  Surgical History Family History Social History   Past Medical History:   Diagnosis Date   ? Hyperlipidemia    ? Hypertension    ?  Prostate CA     Past Surgical History:   Procedure Laterality Date   ? CORONARY ARTERY BYPASS GRAFT     ? JOINT REPLACEMENT      Family History   Problem Relation Age of Onset   ? Heart disease Father     Social History     Social History   ? Marital status:      Spouse name: N/A   ? Number of children: N/A   ? Years of education: N/A     Occupational History   ? Not on file.     Social History Main Topics   ? Smoking status: Former Smoker     Types: Cigarettes     Quit date: 1986   ? Smokeless tobacco: Never Used   ? Alcohol use Yes   ? Drug use: No   ? Sexual activity: Not on file     Other Topics Concern   ? Not on file     Social History Narrative          Medications  Allergies   Current Outpatient Prescriptions   Medication Sig Dispense Refill   ? aspirin 81 MG EC tablet Take 81 mg by mouth daily.     ? atenolol (TENORMIN) 50 MG tablet 100 mg daily.   0   ? naproxen (NAPROSYN) 500 MG tablet Take 500 mg by mouth 2 (two) times a day with meals.     ? simvastatin (ZOCOR) 10 MG tablet Take 10 mg by mouth at bedtime.     ? gabapentin (NEURONTIN) 100 MG capsule 1 cap at bedtime x 3 days, then may take 1 cap twice daily x 3 days, then may take 1 cap three times daily 90 capsule 1   ? hydroCHLOROthiazide (HYDRODIURIL) 25 MG tablet Take 1 tablet (25 mg total) by mouth daily. 90 tablet 3   ? lisinopril (PRINIVIL,ZESTRIL) 40 MG tablet Take 1 tablet (40 mg total) by mouth daily. 90 tablet 3   ? meloxicam (MOBIC) 7.5 MG tablet Take 7.5 mg by mouth daily.       No current facility-administered medications for this visit.       No Known Allergies      Lab Results    Chemistry/lipid CBC Cardiac Enzymes/BNP/TSH/INR   Lab Results   Component Value Date    CHOL 120 09/22/2017    HDL 58 09/22/2017    LDLCALC 53 09/22/2017    TRIG 47 09/22/2017    CREATININE 0.75 02/16/2018    BUN 12 02/16/2018    K 3.8 02/16/2018     (L) 02/16/2018    CL 93 (L) 02/16/2018    CO2 25 02/16/2018    Lab Results   Component Value Date     WBC 4.8 02/16/2018    HGB 14.7 02/16/2018    HCT 43.8 02/16/2018    MCV 97 02/16/2018     02/16/2018    No results found for: CKTOTAL, CKMB, CKMBINDEX, TROPONINI, BNP, TSH, INR

## 2021-06-26 NOTE — PROGRESS NOTES
Progress Notes by Orly Kauffman CNP at 3/12/2018  7:50 AM     Author: Orly Kauffman CNP Service: -- Author Type: Nurse Practitioner    Filed: 3/12/2018 11:00 AM Encounter Date: 3/12/2018 Status: Signed    : Orly Kauffman CNP (Nurse Practitioner)           Click to link to [facility] Heart Care     [facility] HEART CARE NOTE      Assessment/Recommendations   Assessment:    1. Cardiomyopathy, heart failure with reduced ejection fraction, ejection fraction 25%, NYHA class II: His heart failure symptoms have improved since starting Lasix a few weeks ago.  We reviewed heart failure diagnosis, medications, treatment plan, low sodium diet, weight monitoring, and symptom monitoring.  He met with a heart failure nurse clinician to further discuss.      We discussed changing atenolol to metoprolol or carvedilol due to his decreased ejection fraction.  He is adamantly against changing his atenolol.  He states that he previously took metoprolol and his blood pressure was not well controlled.    2.  Coronary artery disease: History of coronary artery bypass surgery in 1998.  Stress test on March 8, 2018 showed septal scar but no ischemia.  He denies any chest pain.  He stopped taking aspirin on his own but will restart today.    3.  Atrial fibrillation: Diagnosed in February 2018.  He was started on Eliquis but switch to warfarin due to cost.  His primary care provider is monitoring INRs.  He wore a Holter monitor last week and results are pending.    4.  Hypertension: Blood pressure 140/76.    5.  Alcohol use: He admits to drinking 3-4 alcoholic beverages daily.    Plan:  1.   Heart failure medications:  - Beta blocker therapy with atenolol 100 mg daily.  He refuses to change to carvedilol or metoprolol which are recommended for heart failure with reduced ejection fraction.  - ACEI therapy with lisinopril 40 mg daily  - Diuretic therapy with furosemide 40 mg daily  2.  BMP pending  3.  If BMP  is stable, will start spironolactone.  4.  Restart aspirin 81 mg daily  5.  Awaiting results of Holter monitor  6.  Recommended decreasing alcohol intake to no more than 2 alcoholic beverages daily  7.  We discussed decreasing use of NSAIDs but he is not interested due to his hip pain.    Sage Ladd will follow up with Dr. Teague on March 19 and in the heart failure clinic in 4-6 weeks.     History of Present Illness    Mr. Sage Ladd is a 82 y.o. male seen at [facility] heart failure clinic today for continued follow-up.  He has a history of coronary artery bypass surgery in 1998, hypertension, and dyslipidemia.  He was seen by Dr. Teague on February 22 for perioperative evaluation for right hip replacement.  He was found to be in new atrial fibrillation.  He was started on Eliquis which was then changed to warfarin due to cost.  Echocardiogram on March 8, 2018 showed an ejection fraction of 25%, mild aortic stenosis, and mild aortic regurgitation.  Heart failure is a new diagnosis.  Stress test on March 8 showed no ischemia but a septal scar.  He wore a Holter monitor last week and results are pending.  Dr. Teague stopped his hydrochlorothiazide and started Lasix and potassium.    Since starting Lasix, Sage has noted weight loss and significant improvement in symptoms.  He no longer has edema, orthopnea, or shortness of breath with activity.  He is able to walk up one flight of stairs with no symptoms.  His home weight is around 150-164 pounds.  He has lost about 6-10 pounds since starting Lasix.  He denies fatigue, lightheadedness, shortness of breath, dyspnea on exertion, orthopnea, chest pain, abdominal fullness/bloating and lower extremity edema.      He drinks 3-4 alcoholic beverages daily.    ECHO 3/8/18:     No previous study for comparison.    Left ventricle ejection fraction is severely decreased. The estimated left ventricular ejection fraction is 25%.    Normal right ventricular  size and systolic function.    Mild aortic stenosis with mild aortic insufficiency     Physical Examination Review of Systems   Vitals:    03/12/18 0751   BP: 140/76   Pulse: 74   Resp: 18     Body mass index is 24.72 kg/(m^2).  Wt Readings from Last 3 Encounters:   03/12/18 165 lb (74.8 kg)   03/08/18 165 lb (74.8 kg)   02/22/18 165 lb (74.8 kg)       General Appearance:     Alert, cooperative and in no acute distress.   ENT/Mouth: membranes moist, no oral lesions or bleeding gums.      EYES:  no scleral icterus, normal conjunctivae   Neck: no carotid bruits or thyromegaly   Chest/Lungs:   lungs are clear to auscultation, no rales or wheezing, respirations unlabored   Cardiovascular:    Irregularly irregular. Normal first and second heart sounds; the radial and posterior tibial pulses are intact, no edema bilateral lower extremities    Abdomen:  Soft, nontender, nondistended, bowel sounds present   Extremities: no cyanosis or clubbing   Skin: warm, dry.    Neurologic: mood and affect are appropriate, alert and oriented x3      General: WNL  Eyes: WNL  Ears/Nose/Throat: WNL  Lungs: WNL  Heart: WNL  Stomach: WNL  Bladder: WNL  Muscle/Joints: WNL  Skin: WNL  Nervous System: WNL  Mental Health: WNL     Blood: WNL     Medical History  Surgical History Family History Social History   Past Medical History:   Diagnosis Date   ? Atrial fibrillation    ? Coronary artery disease    ? Hyperlipidemia    ? Hypertension    ? Prostate CA     Past Surgical History:   Procedure Laterality Date   ? CORONARY ARTERY BYPASS GRAFT  1998   ? JOINT REPLACEMENT      Family History   Problem Relation Age of Onset   ? Heart disease Father     Social History     Social History   ? Marital status:      Spouse name: N/A   ? Number of children: N/A   ? Years of education: N/A     Occupational History   ? Not on file.     Social History Main Topics   ? Smoking status: Former Smoker     Types: Cigarettes     Quit date: 1986   ? Smokeless  tobacco: Never Used   ? Alcohol use Yes   ? Drug use: No   ? Sexual activity: Not on file     Other Topics Concern   ? Not on file     Social History Narrative          Medications  Allergies   Current Outpatient Prescriptions   Medication Sig Dispense Refill   ? aspirin 81 MG EC tablet Take 81 mg by mouth daily.     ? atenolol (TENORMIN) 50 MG tablet 100 mg daily.   0   ? furosemide (LASIX) 40 MG tablet Take 1 tablet (40 mg total) by mouth daily. 30 tablet 11   ? lisinopril (PRINIVIL,ZESTRIL) 40 MG tablet Take 1 tablet (40 mg total) by mouth daily. 90 tablet 3   ? naproxen (NAPROSYN) 500 MG tablet Take 500 mg by mouth 2 (two) times a day with meals.     ? potassium chloride 20 mEq TbER Take 20 mEq by mouth daily. 30 tablet 11   ? simvastatin (ZOCOR) 10 MG tablet Take 10 mg by mouth at bedtime.     ? warfarin (COUMADIN) 6 MG tablet Take 6 mg by mouth See Admin Instructions.       No current facility-administered medications for this visit.       No Known Allergies      Lab Results    Chemistry CBC BNP   Lab Results   Component Value Date    CREATININE 0.78 02/22/2018    BUN 15 02/22/2018     (L) 02/22/2018    K 3.7 02/22/2018    CL 93 (L) 02/22/2018    CO2 25 02/22/2018     Creatinine (mg/dL)   Date Value   02/22/2018 0.78   02/16/2018 0.75   09/22/2017 0.74   05/06/2016 0.73    Lab Results   Component Value Date    WBC 5.1 02/22/2018    HGB 15.0 02/22/2018    HCT 45.5 02/22/2018     02/22/2018     02/22/2018    Lab Results   Component Value Date    BNP 2370 (H) 02/22/2018     BNP (pg/mL)   Date Value   02/22/2018 2370 (H)          40 minutes were spent with the patient with greater than 50% spent on education and counseling.      Orly Kauffman, [facility] Heart Care   Heart Failure Clinic

## 2021-06-26 NOTE — PROGRESS NOTES
Progress Notes by Jenny Teague MD at 3/19/2018  1:30 PM     Author: Jenny Teague MD Service: -- Author Type: Physician    Filed: 3/19/2018  2:31 PM Encounter Date: 3/19/2018 Status: Signed    : Jenny Teague MD (Physician)           Click to link to [facility] HEART CARE NOTE    Thank you, Dr. Peñaloza, for asking us to see Sage Ladd at the [facility].      Assessment/Recommendations   Assessment:    1.  Coronary artery disease status post coronary artery bypass surgery in 1998: Recent stress test shows only a small to medium size area of infarction and no evidence for significant ischemia.  Continue on daily aspirin.  No anginal complaints.  2.  Cardiomyopathy: Newly diagnosed ischemic cardiomyopathy with left ventricular ejection fraction 25%.  Now that he is better medically optimized recommend repeat echocardiogram in a few months and if left ventricular ejection fraction remains severely depressed will recommend considering defibrillator placement for primary prevention.  Continue atenolol, ACE inhibitor, spironolactone and Lasix.  3.  Chronic systolic congestive heart failure: Appears very well compensated on Lasix 40 mg daily.  4.  Preoperative evaluation: Now appears to be well optimized on medical therapy with no signs of decompensated congestive heart failure.  No anginal complaints with stress test being low risk without any significant ischemia.  He may proceed with his right hip replacement surgery as planned.  Atrial fibrillation is well rate controlled.  May hold Coumadin for the procedure.  5.  Atrial fibrillation: Chronic and well rate controlled.  On Coumadin for anticoagulation  Follow-up with me in 3 months       History of Present Illness    Mr. Sage Ladd is a 82 y.o. male  with history of coronary artery disease status post coronary artery bypass surgery 1998, hypertension, dyslipidemia who I am  seeing in follow-up.  I saw him for an initial consultation last month for preoperative evaluation prior to right hip replacement surgery.  He had noticed worsening shortness of breath and edema.  He was found to be new onset atrial fibrillation.  Holter monitor showed ventricular ectopy with rate controlled atrial fibrillation.  Echocardiogram was remarkable for a severely reduced ejection fraction of 25% and mild aortic stenosis.  Lexiscan nuclear stress test demonstrated no ischemia but did show an area of infarction of small to medium size in the septal wall.  He was started on Lasix and spironolactone.  He has noticed significant improvement in his breathing and swelling.  No complaints of chest pain or palpitations.  He is weighing himself daily and watching his sodium level.    3/8/18 echocardiogram    No previous study for comparison.    Left ventricle ejection fraction is severely decreased. The estimated left ventricular ejection fraction is 25%.    Normal right ventricular size and systolic function.    Mild aortic stenosis with mild aortic insufficiency        3/8/18 Lexiscan nuclear stress test    1.The pharmacologic nuclear stress test is abnormal.    2.There is a small to medium sized area of scar in the mid to basilar septal segment(s) of the left ventricle. No ischemia suggested.    3.The left ventricular ejection fraction is 43%.    4.When compared to report of the images of 9/19/2007, the septal scar appears new, and ejection fraction was reported 51% is now 43%. Images unavailable.    3/13/2018  HOLTER MONITOR     IMPRESSION:  1.  A 24-hour Holter monitor was applied 03/08/2018 with date of interpretation  03/13/2018.  2.  Atrial fibrillation is seen throughout the recording.  3.  Average ventricular rate is 71 beats per minute with heart rate ranging between  37 and 103 beats per minute.  4.  Some nocturnal bradycardia is seen with pauses up to 2 seconds.  During waking  hours, no bradycardia or  pauses noted.  5.  Moderately frequent ventricular ectopy consisting of 3516 PVCs.  A few couplets  are seen.  There is a 4-beat cynthia but more of an idioventricular pattern - 82 beats  per minute.  6.  Patient activity diary was not returned.     DISCUSSION:  1.  Persistent atrial fibrillation with a controlled ventricular response.  1.  Moderately frequent low complexity ventricular ectopy.        CATHLEEN CHEN MD  foley  KATIE 03/13/2018 14:46:04  T 03/13/2018 15:47:37       Physical Examination Review of Systems   Vitals:    03/19/18 1336   BP: 110/78   Pulse: 70   Resp: 18     Body mass index is 24.42 kg/(m^2).  Wt Readings from Last 3 Encounters:   03/19/18 163 lb (73.9 kg)   03/12/18 165 lb (74.8 kg)   03/08/18 165 lb (74.8 kg)       General Appearance:   alert, no apparent distress   HEENT:  no scleral icterus; the mucous membranes are pink and moist                                  Neck: jugular venous pressure normal   Chest: the spine is straight and the chest is symmetric   Lungs:   respirations unlabored; the lungs are clear to auscultation   Cardiovascular:   irregular rhythm with normal first and second heart sounds and II/VI systolic murmur   Abdomen:  no organomegaly, masses, bruits, or tenderness; bowel sounds are present   Extremities: mild edema   Skin: no xanthelasma    General: WNL  Eyes: WNL  Ears/Nose/Throat: WNL  Lungs: WNL  Heart: WNL  Stomach: WNL  Bladder: WNL  Muscle/Joints: WNL  Skin: WNL  Nervous System: WNL  Mental Health: WNL     Blood: WNL     Medical History  Surgical History Family History Social History   Past Medical History:   Diagnosis Date   ? Atrial fibrillation    ? Coronary artery disease    ? Hyperlipidemia    ? Hypertension    ? Prostate CA     Past Surgical History:   Procedure Laterality Date   ? CORONARY ARTERY BYPASS GRAFT  1998   ? JOINT REPLACEMENT      Family History   Problem Relation Age of Onset   ? Heart disease Father     Social History     Social History   ?  Marital status:      Spouse name: N/A   ? Number of children: N/A   ? Years of education: N/A     Occupational History   ? Not on file.     Social History Main Topics   ? Smoking status: Former Smoker     Types: Cigarettes     Quit date: 1986   ? Smokeless tobacco: Never Used   ? Alcohol use Yes   ? Drug use: No   ? Sexual activity: Not on file     Other Topics Concern   ? Not on file     Social History Narrative          Medications  Allergies   Current Outpatient Prescriptions   Medication Sig Dispense Refill   ? aspirin 81 MG EC tablet Take 81 mg by mouth daily.     ? atenolol (TENORMIN) 50 MG tablet 100 mg daily.   0   ? furosemide (LASIX) 40 MG tablet Take 1 tablet (40 mg total) by mouth daily. 30 tablet 11   ? lisinopril (PRINIVIL,ZESTRIL) 40 MG tablet Take 1 tablet (40 mg total) by mouth daily. 90 tablet 3   ? naproxen (NAPROSYN) 500 MG tablet Take 500 mg by mouth 2 (two) times a day with meals.     ? simvastatin (ZOCOR) 10 MG tablet Take 10 mg by mouth at bedtime.     ? spironolactone (ALDACTONE) 25 MG tablet Take 0.5 tablets (12.5 mg total) by mouth daily. 15 tablet 2   ? warfarin (COUMADIN) 6 MG tablet Take 6 mg by mouth See Admin Instructions.       No current facility-administered medications for this visit.       No Known Allergies      Lab Results    Chemistry/lipid CBC Cardiac Enzymes/BNP/TSH/INR   Lab Results   Component Value Date    CHOL 120 09/22/2017    HDL 58 09/22/2017    LDLCALC 53 09/22/2017    TRIG 47 09/22/2017    CREATININE 0.76 03/12/2018    BUN 14 03/12/2018    K 3.9 03/12/2018     (L) 03/12/2018    CL 88 (L) 03/12/2018    CO2 28 03/12/2018    Lab Results   Component Value Date    WBC 5.1 02/22/2018    HGB 15.0 02/22/2018    HCT 45.5 02/22/2018     02/22/2018     02/22/2018    Lab Results   Component Value Date    BNP 2370 (H) 02/22/2018

## 2021-06-26 NOTE — PROGRESS NOTES
Progress Notes by Jenny Teague MD at 6/20/2018  1:10 PM     Author: Jenny Teague MD Service: -- Author Type: Physician    Filed: 6/20/2018  2:15 PM Encounter Date: 6/20/2018 Status: Signed    : Jenny Teague MD (Physician)           Click to link to [facility] HEART CARE NOTE    Thank you, Dr. Peñaloza, for asking us to see Sage Ladd at the [facility].      Assessment/Recommendations   Assessment:    1.  Coronary artery disease status post coronary artery bypass surgery in 1998: Pharmacological stress test shows only a small to medium size area of infarction and no evidence for significant ischemia.  Continue on daily aspirin.  No anginal complaints.  2.    Ischemic cardiomyopathy: Left ventricular ejection fraction 25% on echocardiogram in 43% on stress testing.  Need repeat limited echocardiogram and can discuss with him whether or not he would want to be a candidate for defibrillator placement for primary prevention.  Continue on atenolol, spironolactone and Lasix.  Will check labs today consider adding back his ACE inhibitor.  3.  Chronic systolic congestive heart failure: Volume overloaded today.  Increase Lasix to twice a day.  Check labs including BNP and BMP today and plan on follow-up in our heart failure clinic in 1-2 weeks.  4.    Hypertension: Poorly controlled.  Start Norvasc 2.5 mg daily.  5.  Atrial fibrillation: Chronic and well rate controlled.  On Coumadin for anticoagulation  Follow-up with me in a month.       History of Present Illness    Mr. Sage Ladd is a 82 y.o. male with history of coronary artery disease status post coronary artery bypass surgery 1998, severe ischemic cardiomyopathy, chronic systolic congestive heart failure, chronic atrial fibrillation hypertension, dyslipidemia who I am seeing in follow-up.  I initially saw him for preoperative evaluation prior to right hip replacement surgery.   He was found to have new onset atrial fibrillation of unknown duration, ischemic cardiomyopathy with EF of 25% and systolic congestive heart failure.  Lasix and spironolactone were started.  Lisinopril and hydrochlorothiazide were stopped due to hyponatremia.  He underwent surgery without complications.  Today he reports he has been having trouble sleeping at night as he has been waking up gasping for air.  He has lower extremity edema but overall feels that is better controlled than previously.  He reports that his weight has remained stable.  No complaints of chest pain.  Lexiscan nuclear stress test demonstrated small to medium size area of infarction without evidence of ischemia.  Echocardiogram 3/8/2018    No previous study for comparison.    Left ventricle ejection fraction is severely decreased. The estimated left ventricular ejection fraction is 25%.    Normal right ventricular size and systolic function.    Mild aortic stenosis with mild aortic insufficiency    Lexiscan nuclear stress test    1.The pharmacologic nuclear stress test is abnormal.    2.There is a small to medium sized area of scar in the mid to basilar septal segment(s) of the left ventricle. No ischemia suggested.    3.The left ventricular ejection fraction is 43%.    4.When compared to report of the images of 9/19/2007, the septal scar appears new, and ejection fraction was reported 51% is now 43%. Images unavailable.           Physical Examination Review of Systems   Vitals:    06/20/18 1321   BP: 160/90   Pulse: 68   Resp: 12     Body mass index is 25.62 kg/(m^2).  Wt Readings from Last 3 Encounters:   06/20/18 171 lb (77.6 kg)   03/19/18 163 lb (73.9 kg)   03/12/18 165 lb (74.8 kg)       General Appearance:   alert, no apparent distress   HEENT:  no scleral icterus; the mucous membranes are pink and moist                                  Neck: jugular venous pressure mildly elevated   Chest: the spine is straight and the chest is  symmetric   Lungs:   respirations unlabored; the lungs are clear to auscultation   Cardiovascular:    Irregular rhythm with normal first and second heart sounds and 2/6 systolic murmur   Abdomen:  no organomegaly, masses, bruits, or tenderness; bowel sounds are present   Extremities:  Mild bilateral lower extremity edema   Skin: no xanthelasma                                              Medical History  Surgical History Family History Social History   Past Medical History:   Diagnosis Date   ? Atrial fibrillation (H)    ? Chronic systolic congestive heart failure (H)    ? Coronary artery disease     involving native coronary artery of native heart with angina pectoris   ? History of total right hip replacement    ? Hyperlipidemia    ? Hypertension    ? Postoperative anemia due to acute blood loss    ? Prostate CA (H)     Past Surgical History:   Procedure Laterality Date   ? CORONARY ARTERY BYPASS GRAFT  1998   ? JOINT REPLACEMENT     ? TOTAL HIP ARTHROPLASTY Right     Family History   Problem Relation Age of Onset   ? Heart disease Father     Social History     Social History   ? Marital status:      Spouse name: N/A   ? Number of children: N/A   ? Years of education: N/A     Occupational History   ? Not on file.     Social History Main Topics   ? Smoking status: Former Smoker     Types: Cigarettes     Quit date: 1986   ? Smokeless tobacco: Never Used   ? Alcohol use Yes   ? Drug use: No   ? Sexual activity: Not on file     Other Topics Concern   ? Not on file     Social History Narrative          Medications  Allergies      No Known Allergies      Lab Results    Chemistry/lipid CBC Cardiac Enzymes/BNP/TSH/INR   Lab Results   Component Value Date    CHOL 120 09/22/2017    HDL 58 09/22/2017    LDLCALC 53 09/22/2017    TRIG 47 09/22/2017    CREATININE 0.82 04/10/2018    BUN 12 04/10/2018    K 3.8 04/10/2018     (L) 04/10/2018    CL 88 (L) 04/10/2018    CO2 30 04/10/2018    Lab Results   Component Value  Date    WBC 4.6 03/29/2018    HGB 9.8 (L) 04/25/2018    HCT 42.1 03/29/2018    MCV 93 03/29/2018     03/29/2018    Lab Results   Component Value Date    BNP 2370 (H) 02/22/2018                3

## 2021-07-03 NOTE — ADDENDUM NOTE
Addendum Note by Ghanshyam Andres MD at 2/22/2018  2:54 PM     Author: Ghanshyam Anrdes MD Service: -- Author Type: Physician    Filed: 2/22/2018  2:54 PM Encounter Date: 2/22/2018 Status: Signed    : Ghanshyam Andres MD (Physician)    Addended by: GHANSHYAM ANDRES on: 2/22/2018 02:54 PM        Modules accepted: Orders